# Patient Record
Sex: MALE | Race: WHITE | Employment: UNEMPLOYED | URBAN - METROPOLITAN AREA
[De-identification: names, ages, dates, MRNs, and addresses within clinical notes are randomized per-mention and may not be internally consistent; named-entity substitution may affect disease eponyms.]

---

## 2023-08-18 ENCOUNTER — LAB (OUTPATIENT)
Dept: LAB | Facility: CLINIC | Age: 58
End: 2023-08-18
Payer: COMMERCIAL

## 2023-08-18 ENCOUNTER — OFFICE VISIT (OUTPATIENT)
Dept: CARDIOLOGY | Facility: CLINIC | Age: 58
End: 2023-08-18
Payer: COMMERCIAL

## 2023-08-18 VITALS
SYSTOLIC BLOOD PRESSURE: 106 MMHG | HEIGHT: 69 IN | BODY MASS INDEX: 22.97 KG/M2 | HEART RATE: 66 BPM | WEIGHT: 155.1 LBS | DIASTOLIC BLOOD PRESSURE: 72 MMHG | OXYGEN SATURATION: 100 %

## 2023-08-18 DIAGNOSIS — I25.10 CORONARY ARTERY DISEASE INVOLVING NATIVE CORONARY ARTERY OF NATIVE HEART WITHOUT ANGINA PECTORIS: Primary | ICD-10-CM

## 2023-08-18 DIAGNOSIS — E11.9 TYPE 2 DIABETES MELLITUS WITHOUT COMPLICATION, WITHOUT LONG-TERM CURRENT USE OF INSULIN (H): ICD-10-CM

## 2023-08-18 DIAGNOSIS — E78.2 MIXED HYPERLIPIDEMIA: ICD-10-CM

## 2023-08-18 DIAGNOSIS — I25.10 CORONARY ARTERY DISEASE INVOLVING NATIVE CORONARY ARTERY OF NATIVE HEART WITHOUT ANGINA PECTORIS: ICD-10-CM

## 2023-08-18 DIAGNOSIS — I35.0 NONRHEUMATIC AORTIC VALVE STENOSIS: ICD-10-CM

## 2023-08-18 DIAGNOSIS — I10 ESSENTIAL HYPERTENSION: ICD-10-CM

## 2023-08-18 LAB
ALBUMIN SERPL BCG-MCNC: 4.3 G/DL (ref 3.5–5.2)
ALP SERPL-CCNC: 49 U/L (ref 40–129)
ALT SERPL W P-5'-P-CCNC: 41 U/L (ref 0–70)
ANION GAP SERPL CALCULATED.3IONS-SCNC: 8 MMOL/L (ref 7–15)
AST SERPL W P-5'-P-CCNC: 25 U/L (ref 0–45)
BILIRUB SERPL-MCNC: 0.8 MG/DL
BUN SERPL-MCNC: 23.7 MG/DL (ref 8–23)
CALCIUM SERPL-MCNC: 9.4 MG/DL (ref 8.8–10.2)
CHLORIDE SERPL-SCNC: 104 MMOL/L (ref 98–107)
CHOLEST SERPL-MCNC: 129 MG/DL
CREAT SERPL-MCNC: 0.89 MG/DL (ref 0.67–1.17)
CRP SERPL HS-MCNC: 0.32 MG/L
DEPRECATED HCO3 PLAS-SCNC: 26 MMOL/L (ref 22–29)
ERYTHROCYTE [DISTWIDTH] IN BLOOD BY AUTOMATED COUNT: 13.4 % (ref 10–15)
GFR SERPL CREATININE-BSD FRML MDRD: >90 ML/MIN/1.73M2
GLUCOSE SERPL-MCNC: 129 MG/DL (ref 70–99)
HBA1C MFR BLD: 5.5 %
HCT VFR BLD AUTO: 37.6 % (ref 40–53)
HDLC SERPL-MCNC: 71 MG/DL
HGB BLD-MCNC: 12.8 G/DL (ref 13.3–17.7)
LDLC SERPL CALC-MCNC: 52 MG/DL
MCH RBC QN AUTO: 33.6 PG (ref 26.5–33)
MCHC RBC AUTO-ENTMCNC: 34 G/DL (ref 31.5–36.5)
MCV RBC AUTO: 99 FL (ref 78–100)
NONHDLC SERPL-MCNC: 58 MG/DL
PLATELET # BLD AUTO: 185 10E3/UL (ref 150–450)
POTASSIUM SERPL-SCNC: 4.7 MMOL/L (ref 3.4–5.3)
PROT SERPL-MCNC: 6.7 G/DL (ref 6.4–8.3)
RBC # BLD AUTO: 3.81 10E6/UL (ref 4.4–5.9)
SODIUM SERPL-SCNC: 138 MMOL/L (ref 136–145)
TRIGL SERPL-MCNC: 30 MG/DL
WBC # BLD AUTO: 6.1 10E3/UL (ref 4–11)

## 2023-08-18 PROCEDURE — 93000 ELECTROCARDIOGRAM COMPLETE: CPT | Performed by: INTERNAL MEDICINE

## 2023-08-18 PROCEDURE — 80061 LIPID PANEL: CPT | Performed by: INTERNAL MEDICINE

## 2023-08-18 PROCEDURE — 86141 C-REACTIVE PROTEIN HS: CPT | Performed by: INTERNAL MEDICINE

## 2023-08-18 PROCEDURE — 83036 HEMOGLOBIN GLYCOSYLATED A1C: CPT | Performed by: INTERNAL MEDICINE

## 2023-08-18 PROCEDURE — 85027 COMPLETE CBC AUTOMATED: CPT | Performed by: INTERNAL MEDICINE

## 2023-08-18 PROCEDURE — 36415 COLL VENOUS BLD VENIPUNCTURE: CPT | Performed by: INTERNAL MEDICINE

## 2023-08-18 PROCEDURE — 80053 COMPREHEN METABOLIC PANEL: CPT | Performed by: INTERNAL MEDICINE

## 2023-08-18 PROCEDURE — 99204 OFFICE O/P NEW MOD 45 MIN: CPT | Performed by: INTERNAL MEDICINE

## 2023-08-18 RX ORDER — CARVEDILOL 6.25 MG/1
6.25 TABLET ORAL 2 TIMES DAILY WITH MEALS
COMMUNITY
End: 2023-09-15

## 2023-08-18 RX ORDER — ATORVASTATIN CALCIUM 40 MG/1
40 TABLET, FILM COATED ORAL DAILY
COMMUNITY
End: 2023-09-15

## 2023-08-18 RX ORDER — LISINOPRIL 10 MG/1
10 TABLET ORAL DAILY
COMMUNITY
End: 2023-09-15

## 2023-08-18 RX ORDER — CLOPIDOGREL BISULFATE 75 MG/1
75 TABLET ORAL DAILY
COMMUNITY
End: 2023-09-15

## 2023-08-18 NOTE — PROGRESS NOTES
CARDIOLOGY CLINIC CONSULTATION      REASON FOR CONSULT:   CAD, establish care    PRIMARY CARE PHYSICIAN:  Physician No Ref-Primary        History of Present Illness   Ady Lawton is an extremely pleasant 58 year old male here as a new patient to establish care.  He has a history significant for CAD c/b NSTEMI in 3/2022 at [facility] stenting of the LAD/D2 bifurcation, with mild-moderate nonobstructive CAD elsewhere, aortic stenosis, hypertension, dyslipidemia, and diet-controlled DM 2.  He has no family history of premature CAD.  He is a never smoker.  He no longer drinks alcohol.    He presents today to establish care with a cardiologist.  He is currently doing well with no cardiac symptoms whatsoever since his stenting.  He does not do as much formal exercise currently as he did during cardiac rehab, but he still continues to be very physically active and has dramatically reformed his diet.  He has lost over 85 pounds since March 2022.    As part of today's visit, I reviewed his care everywhere angiogram report, his most recent echocardiogram report, and his most recent labs in care everywhere including his TSH, BMP, CBC, A1c, and lipid panel.      Assessment & Plan     CAD c/b NSTEMI in 3/2022 at [facility] crush stenting of the LAD/D2 bifurcation  Mild-moderate nonobstructive CAD elsewhere, currently CCS 0 angina  Mild aortic stenosis  Hypertension, well controlled   Dyslipidemia  Diet-controlled DM 2  Never smoker      It was a pleasure to meet with Ady in clinic today.  I am glad to hear that he is currently feeling so well, and congratulated him on the excellent lifestyle changes that he has made over the past 18 months.  I encouraged him to keep this up.  For now, we will focus on secondary prevention.  His blood pressure is currently very well controlled.  We will check his lipid panel and adjust his statin if needed based on this.  We will also check a CRP and if  this is substantially elevated will plan to start him on low-dose colchicine.  He does mention some issues with gas so this may be a problem if he has excessive GI side effects, but we will deal with this if it arises.  We will also check a repeat echocardiogram to follow his mild aortic stenosis.      -Labs: CBC, CMP, A1c, CRP, lipid panel  -TTE  -Continue P2 Y12 inhibitor (Plavix) monotherapy lifelong  -Continue atorvastatin 40 mg daily for now; will adjust if needed based on lipid panel results  -Consider low-dose colchicine if CRP is significantly elevated  -Continue lisinopril 10 mg daily and carvedilol 6.25 mg twice daily  -Continue heart healthy diet and regular aerobic exercise        Follow-up: 1 year, or sooner as needed        Yovanny Mesa MD  Interventional Cardiology  August 18, 2023        Medications   Current Outpatient Medications   Medication    atorvastatin (LIPITOR) 40 MG tablet    carvedilol (COREG) 6.25 MG tablet    clopidogrel (PLAVIX) 75 MG tablet    lisinopril (ZESTRIL) 10 MG tablet     No current facility-administered medications for this visit.     Allergies   No Known Allergies      Physical Exam       BP: 106/72 Pulse: 66     SpO2: 100 %      Vital Signs with Ranges  Pulse:  [66] 66  BP: (106)/(72) 106/72  SpO2:  [100 %] 100 %  155 lbs 1.6 oz    Constitutional: Well-appearing, no acute distress  Respiratory: Normal respiratory effort, CTAB  Cardiovascular: RRR, 2/6 systolic murmur at the RUSB.  JVP < 7 cm H2O.  There is no LE edema.  Normal carotid upstrokes, no carotid bruits.

## 2023-08-18 NOTE — LETTER
8/18/2023    Physician No Ref-Primary  No address on file    RE: Ady Lawton       Dear Colleague,     I had the pleasure of seeing Ady Lawton in the [facility].  CARDIOLOGY CLINIC CONSULTATION      REASON FOR CONSULT:   CAD, establish care    PRIMARY CARE PHYSICIAN:  Physician No Ref-Primary        History of Present Illness  Ady Lawton is an extremely pleasant 58 year old male here as a new patient to establish care.  He has a history significant for CAD c/b NSTEMI in 3/2022 at [facility] s/ [facility] crush stenting of the LAD/D2 bifurcation, with mild-moderate nonobstructive CAD elsewhere, aortic stenosis, hypertension, dyslipidemia, and diet-controlled DM 2.  He has no family history of premature CAD.  He is a never smoker.  He no longer drinks alcohol.    He presents today to establish care with a cardiologist.  He is currently doing well with no cardiac symptoms whatsoever since his stenting.  He does not do as much formal exercise currently as he did during cardiac rehab, but he still continues to be very physically active and has dramatically reformed his diet.  He has lost over 85 pounds since March 2022.    As part of today's visit, I reviewed his care everywhere angiogram report, his most recent echocardiogram report, and his most recent labs in care everywhere including his TSH, BMP, CBC, A1c, and lipid panel.      Assessment & Plan    CAD c/b NSTEMI in 3/2022 at [facility] s/ [facility] crush stenting of the LAD/D2 bifurcation  Mild-moderate nonobstructive CAD elsewhere, currently CCS 0 angina  Mild aortic stenosis  Hypertension, well controlled   Dyslipidemia  Diet-controlled DM 2  Never smoker      It was a pleasure to meet with Ady in clinic today.  I am glad to hear that he is currently feeling so well, and congratulated him on the excellent lifestyle changes that he has made over the past 18 months.  I encouraged him to keep this up.  For now, we will focus on  secondary prevention.  His blood pressure is currently very well controlled.  We will check his lipid panel and adjust his statin if needed based on this.  We will also check a CRP and if this is substantially elevated will plan to start him on low-dose colchicine.  He does mention some issues with gas so this may be a problem if he has excessive GI side effects, but we will deal with this if it arises.  We will also check a repeat echocardiogram to follow his mild aortic stenosis.      -Labs: CBC, CMP, A1c, CRP, lipid panel  -TTE  -Continue P2 Y12 inhibitor (Plavix) monotherapy lifelong  -Continue atorvastatin 40 mg daily for now; will adjust if needed based on lipid panel results  -Consider low-dose colchicine if CRP is significantly elevated  -Continue lisinopril 10 mg daily and carvedilol 6.25 mg twice daily  -Continue heart healthy diet and regular aerobic exercise        Follow-up: 1 year, or sooner as needed        Yovanny Mesa MD  Interventional Cardiology  August 18, 2023        Medications  Current Outpatient Medications   Medication    atorvastatin (LIPITOR) 40 MG tablet    carvedilol (COREG) 6.25 MG tablet    clopidogrel (PLAVIX) 75 MG tablet    lisinopril (ZESTRIL) 10 MG tablet     No current facility-administered medications for this visit.     Allergies  No Known Allergies      Physical Exam      BP: 106/72 Pulse: 66     SpO2: 100 %      Vital Signs with Ranges  Pulse:  [66] 66  BP: (106)/(72) 106/72  SpO2:  [100 %] 100 %  155 lbs 1.6 oz    Constitutional: Well-appearing, no acute distress  Respiratory: Normal respiratory effort, CTAB  Cardiovascular: RRR, 2/6 systolic murmur at the RUSB.  JVP < 7 cm H2O.  There is no LE edema.  Normal carotid upstrokes, no carotid bruits.      Thank you for allowing me to participate in the care of your patient.      Sincerely,     Yovanny Mesa MD     [facility]  cc:   Referred Self,

## 2023-09-11 ENCOUNTER — HOSPITAL ENCOUNTER (OUTPATIENT)
Dept: CARDIOLOGY | Facility: CLINIC | Age: 58
Discharge: HOME | End: 2023-09-11
Attending: INTERNAL MEDICINE | Admitting: INTERNAL MEDICINE
Payer: COMMERCIAL

## 2023-09-11 DIAGNOSIS — I25.10 CORONARY ARTERY DISEASE INVOLVING NATIVE CORONARY ARTERY OF NATIVE HEART WITHOUT ANGINA PECTORIS: ICD-10-CM

## 2023-09-11 DIAGNOSIS — E11.9 TYPE 2 DIABETES MELLITUS WITHOUT COMPLICATION, WITHOUT LONG-TERM CURRENT USE OF INSULIN (H): ICD-10-CM

## 2023-09-11 DIAGNOSIS — E78.2 MIXED HYPERLIPIDEMIA: ICD-10-CM

## 2023-09-11 DIAGNOSIS — I10 ESSENTIAL HYPERTENSION: ICD-10-CM

## 2023-09-11 LAB — LVEF ECHO: NORMAL

## 2023-09-11 PROCEDURE — 93306 TTE W/DOPPLER COMPLETE: CPT | Mod: 26 | Performed by: INTERNAL MEDICINE

## 2023-09-11 PROCEDURE — 999N000208 ECHOCARDIOGRAM COMPLETE

## 2023-09-11 PROCEDURE — 255N000002 HC RX 255 OP 636: Performed by: INTERNAL MEDICINE

## 2023-09-11 RX ADMIN — HUMAN ALBUMIN MICROSPHERES AND PERFLUTREN 9 ML: 10; .22 INJECTION, SOLUTION INTRAVENOUS at 09:40

## 2023-09-15 ENCOUNTER — TELEPHONE (OUTPATIENT)
Dept: CARDIOLOGY | Facility: CLINIC | Age: 58
End: 2023-09-15
Payer: COMMERCIAL

## 2023-09-15 DIAGNOSIS — I35.0 NONRHEUMATIC AORTIC VALVE STENOSIS: ICD-10-CM

## 2023-09-15 DIAGNOSIS — E78.2 MIXED HYPERLIPIDEMIA: ICD-10-CM

## 2023-09-15 DIAGNOSIS — I10 ESSENTIAL HYPERTENSION: ICD-10-CM

## 2023-09-15 DIAGNOSIS — I25.10 CORONARY ARTERY DISEASE INVOLVING NATIVE CORONARY ARTERY OF NATIVE HEART WITHOUT ANGINA PECTORIS: ICD-10-CM

## 2023-09-15 RX ORDER — LISINOPRIL 10 MG/1
10 TABLET ORAL DAILY
Qty: 90 TABLET | Refills: 3 | Status: SHIPPED | OUTPATIENT
Start: 2023-09-15 | End: 2024-04-24

## 2023-09-15 RX ORDER — ATORVASTATIN CALCIUM 40 MG/1
40 TABLET, FILM COATED ORAL DAILY
Qty: 90 TABLET | Refills: 3 | Status: SHIPPED | OUTPATIENT
Start: 2023-09-15 | End: 2024-04-24

## 2023-09-15 RX ORDER — CLOPIDOGREL BISULFATE 75 MG/1
75 TABLET ORAL DAILY
Qty: 90 TABLET | Refills: 3 | Status: SHIPPED | OUTPATIENT
Start: 2023-09-15 | End: 2024-04-24

## 2023-09-15 RX ORDER — CARVEDILOL 6.25 MG/1
6.25 TABLET ORAL 2 TIMES DAILY WITH MEALS
Qty: 180 TABLET | Refills: 3 | Status: SHIPPED | OUTPATIENT
Start: 2023-09-15 | End: 2024-04-24

## 2023-09-15 NOTE — TELEPHONE ENCOUNTER
Ady left a v/m on Team 1 RN inquiring about his echo results and provided updated home and work numbers.  Chart updated accordingly.  3-month follow-up echo ordered.  Called and spoke with Ady to communicate Dr Mesa's recommendations below.  Patient will call scheduling himself to set up the echo.  Patient requested annual refills on his cardiac medications.  90 day supply + 0 refills sent to [facility] in [place] for lisinopril, clopidigrel, atorvastatin, and carvedilol.  Pt las saw Dr Mesa on 8/18/23.  [facility] Refill Guideline reviewed.  Medication meets criteria for refill.        Susan Tucker RN  9/12/2023 10:37 AM CDT       Left voicemail for pt's mother (Yany) due to pt's number being out of service. Wondering if there is an updated contact number for pt.  Left team 1's call back number for someone to call back and confirm.    Yovanny Meas MD  9/11/2023  4:48 PM CDT       Echo unfortunately shows worsening of his aortic stenosis, currently up to moderate-severe from only mild in April 2022.  Given his lack of exertional symptoms, I do not think that any intervention is indicated at this time, but I would suggest a short-term follow-up echo in 3 months (December 2023).     Thanks,  Rubio

## 2023-11-04 ENCOUNTER — HEALTH MAINTENANCE LETTER (OUTPATIENT)
Age: 58
End: 2023-11-04

## 2024-01-13 ENCOUNTER — HEALTH MAINTENANCE LETTER (OUTPATIENT)
Age: 59
End: 2024-01-13

## 2024-04-24 ENCOUNTER — OFFICE VISIT (OUTPATIENT)
Dept: CARDIOLOGY | Facility: CLINIC | Age: 59
End: 2024-04-24
Payer: COMMERCIAL

## 2024-04-24 VITALS
HEIGHT: 69 IN | BODY MASS INDEX: 24.42 KG/M2 | OXYGEN SATURATION: 100 % | HEART RATE: 64 BPM | SYSTOLIC BLOOD PRESSURE: 122 MMHG | WEIGHT: 164.9 LBS | DIASTOLIC BLOOD PRESSURE: 74 MMHG

## 2024-04-24 DIAGNOSIS — I35.0 NONRHEUMATIC AORTIC VALVE STENOSIS: Primary | ICD-10-CM

## 2024-04-24 DIAGNOSIS — I25.10 CORONARY ARTERY DISEASE INVOLVING NATIVE CORONARY ARTERY OF NATIVE HEART WITHOUT ANGINA PECTORIS: ICD-10-CM

## 2024-04-24 DIAGNOSIS — I10 ESSENTIAL HYPERTENSION: ICD-10-CM

## 2024-04-24 DIAGNOSIS — E78.2 MIXED HYPERLIPIDEMIA: ICD-10-CM

## 2024-04-24 DIAGNOSIS — J30.81 ALLERGIC RHINITIS DUE TO ANIMALS: ICD-10-CM

## 2024-04-24 PROCEDURE — 99214 OFFICE O/P EST MOD 30 MIN: CPT | Performed by: INTERNAL MEDICINE

## 2024-04-24 RX ORDER — CARVEDILOL 6.25 MG/1
6.25 TABLET ORAL 2 TIMES DAILY WITH MEALS
Qty: 180 TABLET | Refills: 3 | Status: SHIPPED | OUTPATIENT
Start: 2024-04-24

## 2024-04-24 RX ORDER — ROSUVASTATIN CALCIUM 20 MG/1
20 TABLET, COATED ORAL DAILY
Qty: 90 TABLET | Refills: 3 | Status: SHIPPED | OUTPATIENT
Start: 2024-04-24

## 2024-04-24 RX ORDER — CLOPIDOGREL BISULFATE 75 MG/1
75 TABLET ORAL DAILY
Qty: 90 TABLET | Refills: 3 | Status: SHIPPED | OUTPATIENT
Start: 2024-04-24

## 2024-04-24 RX ORDER — ATORVASTATIN CALCIUM 40 MG/1
40 TABLET, FILM COATED ORAL DAILY
Qty: 90 TABLET | Refills: 3 | Status: CANCELLED | OUTPATIENT
Start: 2024-04-24

## 2024-04-24 RX ORDER — LISINOPRIL 10 MG/1
10 TABLET ORAL DAILY
Qty: 90 TABLET | Refills: 3 | Status: SHIPPED | OUTPATIENT
Start: 2024-04-24

## 2024-04-24 NOTE — LETTER
4/24/2024    Physician No Ref-Primary  No address on file    RE: Ady Lawton       Dear Colleague,     I had the pleasure of seeing Ady Lawton in the [facility].  CARDIOLOGY CLINIC FOLLOW-UP NOTE      REASON FOR VISIT:   CAD, aortic stenosis    PRIMARY CARE PHYSICIAN:  Physician No Ref-Primary        History of Present Illness  Ady Lawton is an extremely pleasant 58 year old male here for routine follow-up.  He has a history significant for CAD c/b NSTEMI in 3/2022 at [facility] s/ [facility] crush stenting of the LAD/D2 bifurcation, with mild-moderate nonobstructive CAD elsewhere, aortic stenosis, hypertension, dyslipidemia, diet-controlled DM 2, and prior obesity (resolved with loss of over 80 lbs in 2022).  He has no family history of premature CAD.  He is a never smoker.  He no longer drinks alcohol.    Since his last visit with me in 8/2023, he reports that he is feeling well from a cardiac standpoint.  No chest pain, shortness of breath, palpitations, lower extremity swelling, or bleeding issues.  He does wonder if his cardiac medications may be causing him increased gas.  He has noticed this ever since being started on these medications following his NSTEMI.    His most recent labs are from 8/18/2023, showing total cholesterol 129, HDL 71, LDL 52, triglycerides 30, normal sodium and potassium, creatinine 0.89, hemoglobin 12.8, A1c 5.5%, and platelets 185 with a normal ALT.  I did order an echo following our last visit, but he had declined this due to cost.  His prior echo from 4/2022 showed mild aortic stenosis.      Assessment & Plan    CAD c/b NSTEMI in 3/2022 at [facility] s/ [facility] crush stenting of the LAD/D2 bifurcation, currently CCS 0 angina  Mild-moderate nonobstructive CAD elsewhere, currently CCS 0 angina  Mild aortic stenosis from 4/2022 outside [facility]  Hypertension, well controlled   Dyslipidemia  Diet-controlled DM 2  Prior obesity (resolved with loss of over  80 pounds in 2022)  Never smoker      It was a pleasure to meet with Ady again in clinic today.  I am glad to hear that he continues to do well from a cardiac standpoint.  In regards to the increased gas, I explained that none of his cardiac medications at present are likely to cause significant flatulence.  This is more common with certain other medications such as Zetia, etc.  My primary suspicion would be that the changes to his diet which allowed significant weight loss (and certainly are excellent for his overall health), such as increased fiber intake, may be responsible for his increased gas.  We talked about potentially seeing a nutritionist, etc. to discuss this in detail, but he would like to hold off for now.  Instead, he would like to try changing his statin, which I think is reasonable even if perhaps unlikely to benefit.  We will switch him from atorvastatin 40 mg daily to rosuvastatin 20 mg daily.  Otherwise, we will continue his other cardiac medications unchanged, and plan to see him back in 1 year, with a repeat echo beforehand.        -Continue P2 Y12 inhibitor (Plavix) monotherapy lifelong  -Change atorvastatin 40 mg daily to rosuvastatin 20 mg daily as above to see if this helps with gas  -Continue lisinopril 10 mg daily and carvedilol 6.25 mg twice daily  -Continue heart healthy diet and regular aerobic exercise        Follow-up: 1 year, with labs and TTE before hand, or sooner as needed        Yovanny Mesa MD  Interventional Cardiology  April 24, 2024        Medications  Current Outpatient Medications   Medication Sig Dispense Refill    atorvastatin (LIPITOR) 40 MG tablet Take 1 tablet (40 mg) by mouth daily 90 tablet 3    carvedilol (COREG) 6.25 MG tablet Take 1 tablet (6.25 mg) by mouth 2 times daily (with meals) 180 tablet 3    clopidogrel (PLAVIX) 75 MG tablet Take 1 tablet (75 mg) by mouth daily 90 tablet 3    lisinopril (ZESTRIL) 10 MG tablet Take 1 tablet (10 mg) by mouth daily  90 tablet 3     No current facility-administered medications for this visit.     Allergies  No Known Allergies      Physical Exam      BP: 122/74 Pulse: 64     SpO2: 100 %      Vital Signs with Ranges  Pulse:  [64] 64  BP: (122)/(74) 122/74  SpO2:  [100 %] 100 %  164 lbs 14.4 oz    Constitutional: Well-appearing, no acute distress  Respiratory: Normal respiratory effort, CTAB  Cardiovascular: RRR, 2/6 systolic murmur at the RUSB.  JVP < 7 cm H2O.  There is no LE edema.  Normal carotid upstrokes, no carotid bruits.      Thank you for allowing me to participate in the care of your patient.      Sincerely,     Yovanny Mesa MD     [facility]  cc:   No referring provider defined for this encounter.

## 2024-04-24 NOTE — PROGRESS NOTES
CARDIOLOGY CLINIC FOLLOW-UP NOTE      REASON FOR VISIT:   CAD, aortic stenosis    PRIMARY CARE PHYSICIAN:  Physician No Ref-Primary        History of Present Illness   Ady Lawton is an extremely pleasant 58 year old male here for routine follow-up.  He has a history significant for CAD c/b NSTEMI in 3/2022 at [facility]/ [facility] crush stenting of the LAD/D2 bifurcation, with mild-moderate nonobstructive CAD elsewhere, aortic stenosis, hypertension, dyslipidemia, diet-controlled DM 2, and prior obesity (resolved with loss of over 80 lbs in 2022).  He has no family history of premature CAD.  He is a never smoker.  He no longer drinks alcohol.    Since his last visit with me in 8/2023, he reports that he is feeling well from a cardiac standpoint.  No chest pain, shortness of breath, palpitations, lower extremity swelling, or bleeding issues.  He does wonder if his cardiac medications may be causing him increased gas.  He has noticed this ever since being started on these medications following his NSTEMI.    His most recent labs are from 8/18/2023, showing total cholesterol 129, HDL 71, LDL 52, triglycerides 30, normal sodium and potassium, creatinine 0.89, hemoglobin 12.8, A1c 5.5%, and platelets 185 with a normal ALT.  I did order an echo following our last visit, but he had declined this due to cost.  His prior echo from 4/2022 showed mild aortic stenosis.      Assessment & Plan     CAD c/b NSTEMI in 3/2022 at [facility] crush stenting of the LAD/D2 bifurcation, currently CCS 0 angina  Mild-moderate nonobstructive CAD elsewhere, currently CCS 0 angina  Mild aortic stenosis from 4/2022 outside [facility]  Hypertension, well controlled   Dyslipidemia  Diet-controlled DM 2  Prior obesity (resolved with loss of over 80 pounds in 2022)  Never smoker      It was a pleasure to meet with Ady again in clinic today.  I am glad to hear that he continues to do well from a cardiac standpoint.  In regards to  the increased gas, I explained that none of his cardiac medications at present are likely to cause significant flatulence.  This is more common with certain other medications such as Zetia, etc.  My primary suspicion would be that the changes to his diet which allowed significant weight loss (and certainly are excellent for his overall health), such as increased fiber intake, may be responsible for his increased gas.  We talked about potentially seeing a nutritionist, etc. to discuss this in detail, but he would like to hold off for now.  Instead, he would like to try changing his statin, which I think is reasonable even if perhaps unlikely to benefit.  We will switch him from atorvastatin 40 mg daily to rosuvastatin 20 mg daily.  Otherwise, we will continue his other cardiac medications unchanged, and plan to see him back in 1 year, with a repeat echo beforehand.        -Continue P2 Y12 inhibitor (Plavix) monotherapy lifelong  -Change atorvastatin 40 mg daily to rosuvastatin 20 mg daily as above to see if this helps with gas  -Continue lisinopril 10 mg daily and carvedilol 6.25 mg twice daily  -Continue heart healthy diet and regular aerobic exercise        Follow-up: 1 year, with labs and TTE before hand, or sooner as needed        Yovanny Mesa MD  Interventional Cardiology  April 24, 2024        Medications   Current Outpatient Medications   Medication Sig Dispense Refill    atorvastatin (LIPITOR) 40 MG tablet Take 1 tablet (40 mg) by mouth daily 90 tablet 3    carvedilol (COREG) 6.25 MG tablet Take 1 tablet (6.25 mg) by mouth 2 times daily (with meals) 180 tablet 3    clopidogrel (PLAVIX) 75 MG tablet Take 1 tablet (75 mg) by mouth daily 90 tablet 3    lisinopril (ZESTRIL) 10 MG tablet Take 1 tablet (10 mg) by mouth daily 90 tablet 3     No current facility-administered medications for this visit.     Allergies   No Known Allergies      Physical Exam       BP: 122/74 Pulse: 64     SpO2: 100 %      Vital  Signs with Ranges  Pulse:  [64] 64  BP: (122)/(74) 122/74  SpO2:  [100 %] 100 %  164 lbs 14.4 oz    Constitutional: Well-appearing, no acute distress  Respiratory: Normal respiratory effort, CTAB  Cardiovascular: RRR, 2/6 systolic murmur at the RUSB.  JVP < 7 cm H2O.  There is no LE edema.  Normal carotid upstrokes, no carotid bruits.

## 2024-06-01 ENCOUNTER — HEALTH MAINTENANCE LETTER (OUTPATIENT)
Age: 59
End: 2024-06-01

## 2024-06-03 ENCOUNTER — TRANSFERRED RECORDS (OUTPATIENT)
Dept: MULTI SPECIALTY CLINIC | Facility: CLINIC | Age: 59
End: 2024-06-03
Payer: COMMERCIAL

## 2024-06-03 LAB — RETINOPATHY: NORMAL

## 2024-06-11 ENCOUNTER — ORDERS ONLY (AUTO-RELEASED) (OUTPATIENT)
Dept: FAMILY MEDICINE | Facility: CLINIC | Age: 59
End: 2024-06-11

## 2024-06-11 ENCOUNTER — OFFICE VISIT (OUTPATIENT)
Dept: FAMILY MEDICINE | Facility: CLINIC | Age: 59
End: 2024-06-11
Payer: COMMERCIAL

## 2024-06-11 VITALS
HEIGHT: 70 IN | TEMPERATURE: 99.1 F | WEIGHT: 167.6 LBS | RESPIRATION RATE: 16 BRPM | OXYGEN SATURATION: 99 % | BODY MASS INDEX: 23.99 KG/M2 | SYSTOLIC BLOOD PRESSURE: 119 MMHG | DIASTOLIC BLOOD PRESSURE: 73 MMHG | HEART RATE: 66 BPM

## 2024-06-11 DIAGNOSIS — K43.9 VENTRAL HERNIA WITHOUT OBSTRUCTION OR GANGRENE: ICD-10-CM

## 2024-06-11 DIAGNOSIS — I25.810 CORONARY ARTERY DISEASE INVOLVING AUTOLOGOUS ARTERY CORONARY BYPASS GRAFT WITHOUT ANGINA PECTORIS: ICD-10-CM

## 2024-06-11 DIAGNOSIS — E11.9 TYPE 2 DIABETES MELLITUS WITHOUT COMPLICATION, WITHOUT LONG-TERM CURRENT USE OF INSULIN (H): ICD-10-CM

## 2024-06-11 DIAGNOSIS — Z12.11 SCREEN FOR COLON CANCER: ICD-10-CM

## 2024-06-11 DIAGNOSIS — I10 BENIGN ESSENTIAL HYPERTENSION: ICD-10-CM

## 2024-06-11 DIAGNOSIS — E78.5 HYPERLIPIDEMIA LDL GOAL <100: ICD-10-CM

## 2024-06-11 DIAGNOSIS — R14.2 BELCHING: ICD-10-CM

## 2024-06-11 DIAGNOSIS — Z76.89 ENCOUNTER TO ESTABLISH CARE: Primary | ICD-10-CM

## 2024-06-11 DIAGNOSIS — Z23 NEED FOR VACCINATION: ICD-10-CM

## 2024-06-11 DIAGNOSIS — Z11.59 NEED FOR HEPATITIS C SCREENING TEST: ICD-10-CM

## 2024-06-11 LAB
BASOPHILS # BLD AUTO: 0 10E3/UL (ref 0–0.2)
BASOPHILS NFR BLD AUTO: 1 %
EOSINOPHIL # BLD AUTO: 0.1 10E3/UL (ref 0–0.7)
EOSINOPHIL NFR BLD AUTO: 2 %
ERYTHROCYTE [DISTWIDTH] IN BLOOD BY AUTOMATED COUNT: 13.4 % (ref 10–15)
HBA1C MFR BLD: 5.7 % (ref 0–5.6)
HCT VFR BLD AUTO: 39.1 % (ref 40–53)
HGB BLD-MCNC: 12.8 G/DL (ref 13.3–17.7)
IMM GRANULOCYTES # BLD: 0 10E3/UL
IMM GRANULOCYTES NFR BLD: 0 %
LYMPHOCYTES # BLD AUTO: 1.3 10E3/UL (ref 0.8–5.3)
LYMPHOCYTES NFR BLD AUTO: 27 %
MCH RBC QN AUTO: 32.1 PG (ref 26.5–33)
MCHC RBC AUTO-ENTMCNC: 32.7 G/DL (ref 31.5–36.5)
MCV RBC AUTO: 98 FL (ref 78–100)
MONOCYTES # BLD AUTO: 0.6 10E3/UL (ref 0–1.3)
MONOCYTES NFR BLD AUTO: 12 %
NEUTROPHILS # BLD AUTO: 2.7 10E3/UL (ref 1.6–8.3)
NEUTROPHILS NFR BLD AUTO: 58 %
PLATELET # BLD AUTO: 188 10E3/UL (ref 150–450)
RBC # BLD AUTO: 3.99 10E6/UL (ref 4.4–5.9)
WBC # BLD AUTO: 4.7 10E3/UL (ref 4–11)

## 2024-06-11 PROCEDURE — 99204 OFFICE O/P NEW MOD 45 MIN: CPT | Mod: 25 | Performed by: PHYSICIAN ASSISTANT

## 2024-06-11 PROCEDURE — 99207 PR FOOT EXAM NO CHARGE: CPT | Performed by: PHYSICIAN ASSISTANT

## 2024-06-11 PROCEDURE — 83036 HEMOGLOBIN GLYCOSYLATED A1C: CPT | Performed by: PHYSICIAN ASSISTANT

## 2024-06-11 PROCEDURE — 85025 COMPLETE CBC W/AUTO DIFF WBC: CPT | Performed by: PHYSICIAN ASSISTANT

## 2024-06-11 PROCEDURE — 82570 ASSAY OF URINE CREATININE: CPT | Performed by: PHYSICIAN ASSISTANT

## 2024-06-11 PROCEDURE — 80053 COMPREHEN METABOLIC PANEL: CPT | Performed by: PHYSICIAN ASSISTANT

## 2024-06-11 PROCEDURE — 90471 IMMUNIZATION ADMIN: CPT | Performed by: PHYSICIAN ASSISTANT

## 2024-06-11 PROCEDURE — 90677 PCV20 VACCINE IM: CPT | Performed by: PHYSICIAN ASSISTANT

## 2024-06-11 PROCEDURE — 36415 COLL VENOUS BLD VENIPUNCTURE: CPT | Performed by: PHYSICIAN ASSISTANT

## 2024-06-11 PROCEDURE — 82043 UR ALBUMIN QUANTITATIVE: CPT | Performed by: PHYSICIAN ASSISTANT

## 2024-06-11 RX ORDER — FAMOTIDINE 20 MG/1
20 TABLET, FILM COATED ORAL 2 TIMES DAILY
Qty: 60 TABLET | Refills: 2 | Status: SHIPPED | OUTPATIENT
Start: 2024-06-11

## 2024-06-11 ASSESSMENT — PAIN SCALES - GENERAL: PAINLEVEL: NO PAIN (0)

## 2024-06-11 NOTE — NURSING NOTE
Prior to immunization administration, verified patients identity using patient s name and date of birth. Please see Immunization Activity for additional information.     Screening Questionnaire for Adult Immunization    Are you sick today?   No   Do you have allergies to medications, food, a vaccine component or latex?   Don't Know   Have you ever had a serious reaction after receiving a vaccination?   No   Do you have a long-term health problem with heart, lung, kidney, or metabolic disease (e.g., diabetes), asthma, a blood disorder, no spleen, complement component deficiency, a cochlear implant, or a spinal fluid leak?  Are you on long-term aspirin therapy?   Yes   Do you have cancer, leukemia, HIV/AIDS, or any other immune system problem?   No   Do you have a parent, brother, or sister with an immune system problem?   No   In the past 3 months, have you taken medications that affect  your immune system, such as prednisone, other steroids, or anticancer drugs; drugs for the treatment of rheumatoid arthritis, Crohn s disease, or psoriasis; or have you had radiation treatments?   No   Have you had a seizure, or a brain or other nervous system problem?   No   During the past year, have you received a transfusion of blood or blood    products, or been given immune (gamma) globulin or antiviral drug?   No   For women: Are you pregnant or is there a chance you could become       pregnant during the next month?   No   Have you received any vaccinations in the past 4 weeks?   No     Immunization questionnaire was positive for at least one answer.  Notified Akira Hart PA-C.      Patient instructed to remain in clinic for 15 minutes afterwards, and to report any adverse reactions.     Screening performed by Ember Ross CMA on 6/11/2024 at 11:05 AM.    \

## 2024-06-11 NOTE — Clinical Note
Please abstract the following data from this visit with this patient into the appropriate field in Epic:  Tests that can be patient reported without a hard copy:  Eye exam with ophthalmology on this date: 6/3/2024 Exam Location: [facility]   Other Tests found in the patient's chart through Chart Review/Care Everywhere:  {Abstract Quality List (Optional):490099}  Note to Abstraction: If this section is blank, no results were found via Chart Review/Care Everywhere.

## 2024-06-11 NOTE — PATIENT INSTRUCTIONS
-Start Pepcid 20 mg twice daily x2-3 months for belching.   Follow-up at that time to see if there is improvement.    -Option to see general surgery if enlarging hernia

## 2024-06-11 NOTE — PROGRESS NOTES
Assessment & Plan     Encounter to establish care  Plan for follow-up in 3 months.  See below for further workup.    Type 2 diabetes mellitus without complication, without long-term current use of insulin (H)  Check A1c as well as albumin/creatinine ratio today.  I commended him on his great diet efforts.  Okay to decrease frequency of at home glucose checks to 2-3 times a week.  Yearly eye exam.  - Hemoglobin A1c  - Albumin Random Urine Quantitative with Creat Ratio  - FOOT EXAM  - CBC with platelets and differential  - Comprehensive metabolic panel (BMP + Alb, Alk Phos, ALT, AST, Total. Bili, TP)    Coronary artery disease involving autologous artery coronary bypass graft without angina pectoris  Continue same medication and close follow-up with cardiology  - CBC with platelets and differential  - Comprehensive metabolic panel (BMP + Alb, Alk Phos, ALT, AST, Total. Bili, TP)    Benign essential hypertension  Well-controlled, labs today.  - CBC with platelets and differential  - Comprehensive metabolic panel (BMP + Alb, Alk Phos, ALT, AST, Total. Bili, TP)    Screen for colon cancer  Discussed having colon cancer screening options.  Opts for Cologuard.  - COLOGUARD([employer])    Ventral hernia without obstruction or gangrene  Belching  Discussed ventral/umbilical hernia with patient.  Declines intervention today.  Option the future if change in size or becomes painful could consider general surgery referral.  Unspecified belching/gassiness.  GERD friendly diet/lifestyle.  Start Pepcid 20 mg twice daily.  Hold off on PPI for now as he continues on Plavix.  - famotidine (PEPCID) 20 MG tablet  Dispense: 60 tablet; Refill: 2    Need for vaccination  Prevnar 20 given today.      45 minutes spent by me on the date of the encounter on chart review, review of test results, interpretation of tests, patient visit, and documentation         Return in about 3 months (around 9/11/2024) for follow-up per plan.    The  likelihood of other entities in the differential is insufficient to justify any further testing for them at this time. This was explained to the patient. The patient was advised that persistent or worsening symptoms would require further evaluation. Patient advised to call the office and if unable to reach to go to the emergency room if they develop any new or worsening symptoms. Expressed understanding and agreement with above stated plan.     MILY Chao [facility] JACKLYN Lawton is a pleasant 58 year old male presenting for the following health issues:  Patient presents with:  Establish Care  Diabetes: Medication follow up   Referral: Possible hernia   Health Maintenance:   Eye exam with ophthalmology on this date: 6/3/2024 Exam Location: [facility]  - Abstracted     Here today to establish care. 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  Seen by Dr. Mesa in Cardiology.  Coronary artery disease with stenting in 2022, aortic stenosis, hypertension, dyslipidemia as well as diet-controlled type 2 diabetes.    -Hx of diet-controlled type 2 diabetes.  Initially A1c 7.5%.  Resolved after an approximate 80 pound intentional weight loss secondary to diet.  Continues to monitor glucose readings at home. 100s every morning.  Last A1c 5.6%.  Continues to eat a healthy diet.  Yearly eye exams.    -Recent emergency department visit on 5/2/2024.  Diagnosed with a ventral/umbilical hernia.  Nonpainful.  Has noted belching over the past ~6 months.  Increased gassiness.  No changes in medication.  Denies overt heartburn/GERD type symptoms.  Weight stable.  Regular bowel movements.  No blood in stool.  Denies constipation.    -Has never had a colonoscopy.  No family history of colon cancer.      Review of Systems   Constitutional, HEENT, cardiovascular, pulmonary, GI, , musculoskeletal, neuro, skin, endocrine and psych systems are negative, except as  "otherwise noted.      Objective    /73 (BP Location: Right arm, Patient Position: Sitting, Cuff Size: Adult Regular)   Pulse 66   Temp 99.1  F (37.3  C) (Temporal)   Resp 16   Ht 1.778 m (5' 10\")   Wt 76 kg (167 lb 9.6 oz)   SpO2 99%   BMI 24.05 kg/m    5' 10\"  167 lbs 9.6 oz    Physical Exam   GENERAL: healthy, alert and no distress  EYES: Eyes grossly normal to inspection, PERRL and conjunctivae and sclerae normal  NECK: no adenopathy, no asymmetry, masses, or scars and thyroid normal to palpation  RESP: lungs clear to auscultation - no rales, rhonchi or wheezes  CV: regular rate and rhythm, normal S1 S2, no S3 or S4, no murmur, click or rub, no peripheral edema and peripheral pulses strong  ABDOMEN: soft, nontender, no hepatosplenomegaly, bowel sounds normal.  Small approximately golf ball sized umbilical/ventral hernia.  No signs of incarceration or strangulation.  Nonpainful with palpation.  MS: no gross musculoskeletal defects noted, no edema  SKIN: no suspicious lesions or rashes  NEURO: Normal strength and tone, mentation intact and speech normal  PSYCH: mentation appears normal, affect normal/bright      Answers submitted by the patient for this visit:  Diabetes Visit (Submitted on 6/11/2024)  Chief Complaint: Chronic problems general questions HPI Form  Frequency of checking blood sugars:: one time daily  What time of day are you checking your blood sugars : before meals  Have you had any blood sugars above 200?: No  Have you had any blood sugars below 70?: No  Hypoglycemia symptoms:: none  Diabetic concerns:: other  Paraesthesia present:: none of these symptoms  Have you had a diabetic eye exam within the last year?: No  General Questionnaire (Submitted on 6/11/2024)  Chief Complaint: Chronic problems general questions HPI Form  What is the reason for your visit today? : referal  How many servings of fruits and vegetables do you eat daily?: 2-3  On average, how many sweetened beverages do you " drink each day (Examples: soda, juice, sweet tea, etc.  Do NOT count diet or artificially sweetened beverages)?: 1  How many minutes a day do you exercise enough to make your heart beat faster?: 10 to 19  How many days per week do you miss taking your medication?: 0

## 2024-06-12 LAB
ALBUMIN SERPL BCG-MCNC: 4.4 G/DL (ref 3.5–5.2)
ALP SERPL-CCNC: 55 U/L (ref 40–150)
ALT SERPL W P-5'-P-CCNC: 45 U/L (ref 0–70)
ANION GAP SERPL CALCULATED.3IONS-SCNC: 10 MMOL/L (ref 7–15)
AST SERPL W P-5'-P-CCNC: 26 U/L (ref 0–45)
BILIRUB SERPL-MCNC: 0.9 MG/DL
BUN SERPL-MCNC: 21.4 MG/DL (ref 8–23)
CALCIUM SERPL-MCNC: 9.5 MG/DL (ref 8.8–10.2)
CHLORIDE SERPL-SCNC: 104 MMOL/L (ref 98–107)
CREAT SERPL-MCNC: 0.93 MG/DL (ref 0.67–1.17)
CREAT UR-MCNC: 203 MG/DL
DEPRECATED HCO3 PLAS-SCNC: 23 MMOL/L (ref 22–29)
EGFRCR SERPLBLD CKD-EPI 2021: >90 ML/MIN/1.73M2
GLUCOSE SERPL-MCNC: 94 MG/DL (ref 70–99)
MICROALBUMIN UR-MCNC: <12 MG/L
MICROALBUMIN/CREAT UR: NORMAL MG/G{CREAT}
POTASSIUM SERPL-SCNC: 4.3 MMOL/L (ref 3.4–5.3)
PROT SERPL-MCNC: 6.6 G/DL (ref 6.4–8.3)
SODIUM SERPL-SCNC: 137 MMOL/L (ref 135–145)

## 2024-06-13 NOTE — RESULT ENCOUNTER NOTE
Emery Ramsey,     -Stable blood counts when compared to 9 months ago.     -Your comprehensive metabolic panel which includes tests of liver function (ALT, AST, total bilirubin, alkaline phosphatase), kidney function (creatinine, BUN), electrolytes (sodium, potassium, calcium), and blood sugar (glucose) returned stable for you.    -A1c increased to 5.7% from 5.5%. Keep up great diet routine. Stay active!     Let me know if you have any questions or concerns,     Akira Bradshaw PA-C  [facility]

## 2024-07-26 LAB — NONINV COLON CA DNA+OCC BLD SCRN STL QL: NEGATIVE

## 2024-07-31 ENCOUNTER — PATIENT OUTREACH (OUTPATIENT)
Dept: CARE COORDINATION | Facility: CLINIC | Age: 59
End: 2024-07-31
Payer: COMMERCIAL

## 2024-10-19 ENCOUNTER — HEALTH MAINTENANCE LETTER (OUTPATIENT)
Age: 59
End: 2024-10-19

## 2024-12-22 ENCOUNTER — HEALTH MAINTENANCE LETTER (OUTPATIENT)
Age: 59
End: 2024-12-22

## 2025-01-26 ENCOUNTER — HEALTH MAINTENANCE LETTER (OUTPATIENT)
Age: 60
End: 2025-01-26

## 2025-03-04 ENCOUNTER — TELEPHONE (OUTPATIENT)
Dept: CARDIOLOGY | Facility: CLINIC | Age: 60
End: 2025-03-04
Payer: COMMERCIAL

## 2025-03-04 NOTE — TELEPHONE ENCOUNTER
1st attempt- Left voicemail for the patient to call back and schedule the following:    Appointment type:  Return Cardiology  Provider:  Moshe  Return date:  around 5/30  Additional appointment(s) needed:  NA  Additonal Notes:  R/S from [facility]  Specialty phone number: [phone]    Ruth Ann  on 3/4/2025 at 2:48 PM

## 2025-04-06 DIAGNOSIS — R14.2 BELCHING: ICD-10-CM

## 2025-04-07 RX ORDER — FAMOTIDINE 20 MG/1
20 TABLET, FILM COATED ORAL 2 TIMES DAILY
Qty: 60 TABLET | Refills: 2 | Status: SHIPPED | OUTPATIENT
Start: 2025-04-07

## 2025-05-03 ENCOUNTER — HEALTH MAINTENANCE LETTER (OUTPATIENT)
Age: 60
End: 2025-05-03

## 2025-07-14 DIAGNOSIS — I35.0 NONRHEUMATIC AORTIC VALVE STENOSIS: ICD-10-CM

## 2025-07-14 DIAGNOSIS — I25.10 CORONARY ARTERY DISEASE INVOLVING NATIVE CORONARY ARTERY OF NATIVE HEART WITHOUT ANGINA PECTORIS: ICD-10-CM

## 2025-07-14 DIAGNOSIS — I10 ESSENTIAL HYPERTENSION: ICD-10-CM

## 2025-07-14 DIAGNOSIS — E78.2 MIXED HYPERLIPIDEMIA: ICD-10-CM

## 2025-07-14 RX ORDER — ROSUVASTATIN CALCIUM 20 MG/1
20 TABLET, COATED ORAL DAILY
Qty: 90 TABLET | Refills: 0 | Status: SHIPPED | OUTPATIENT
Start: 2025-07-14

## 2025-07-14 RX ORDER — LISINOPRIL 10 MG/1
10 TABLET ORAL DAILY
Qty: 90 TABLET | Refills: 0 | Status: SHIPPED | OUTPATIENT
Start: 2025-07-14

## 2025-07-14 RX ORDER — CLOPIDOGREL BISULFATE 75 MG/1
75 TABLET ORAL DAILY
Qty: 90 TABLET | Refills: 0 | Status: SHIPPED | OUTPATIENT
Start: 2025-07-14

## 2025-07-14 RX ORDER — CARVEDILOL 6.25 MG/1
6.25 TABLET ORAL 2 TIMES DAILY WITH MEALS
Qty: 180 TABLET | Refills: 0 | Status: SHIPPED | OUTPATIENT
Start: 2025-07-14

## 2025-07-14 NOTE — TELEPHONE ENCOUNTER
[facility] Cardiology Refill Guideline reviewed.  Pt is due for labs and follow up. Refill letter sent to pt. Prescriptions sent to pharmacy for 90 day supply and 0 refills.

## 2025-07-16 ENCOUNTER — RESULTS FOLLOW-UP (OUTPATIENT)
Dept: FAMILY MEDICINE | Facility: CLINIC | Age: 60
End: 2025-07-16

## 2025-07-16 ENCOUNTER — OFFICE VISIT (OUTPATIENT)
Dept: FAMILY MEDICINE | Facility: CLINIC | Age: 60
End: 2025-07-16
Payer: COMMERCIAL

## 2025-07-16 VITALS
HEIGHT: 70 IN | OXYGEN SATURATION: 100 % | HEART RATE: 62 BPM | RESPIRATION RATE: 18 BRPM | TEMPERATURE: 96.9 F | BODY MASS INDEX: 23.19 KG/M2 | SYSTOLIC BLOOD PRESSURE: 130 MMHG | DIASTOLIC BLOOD PRESSURE: 68 MMHG | WEIGHT: 162 LBS

## 2025-07-16 DIAGNOSIS — Z12.5 SCREENING FOR PROSTATE CANCER: ICD-10-CM

## 2025-07-16 DIAGNOSIS — E78.5 HYPERLIPIDEMIA LDL GOAL <100: ICD-10-CM

## 2025-07-16 DIAGNOSIS — I10 BENIGN ESSENTIAL HYPERTENSION: ICD-10-CM

## 2025-07-16 DIAGNOSIS — I25.810 CORONARY ARTERY DISEASE INVOLVING AUTOLOGOUS ARTERY CORONARY BYPASS GRAFT WITHOUT ANGINA PECTORIS: ICD-10-CM

## 2025-07-16 DIAGNOSIS — E11.9 TYPE 2 DIABETES MELLITUS WITHOUT COMPLICATION, WITHOUT LONG-TERM CURRENT USE OF INSULIN (H): Primary | ICD-10-CM

## 2025-07-16 LAB
ALBUMIN SERPL BCG-MCNC: 4.3 G/DL (ref 3.5–5.2)
ALP SERPL-CCNC: 47 U/L (ref 40–150)
ALT SERPL W P-5'-P-CCNC: 27 U/L (ref 0–70)
ANION GAP SERPL CALCULATED.3IONS-SCNC: 11 MMOL/L (ref 7–15)
AST SERPL W P-5'-P-CCNC: 27 U/L (ref 0–45)
BILIRUB SERPL-MCNC: 0.7 MG/DL
BUN SERPL-MCNC: 22 MG/DL (ref 8–23)
CALCIUM SERPL-MCNC: 9.7 MG/DL (ref 8.8–10.4)
CHLORIDE SERPL-SCNC: 104 MMOL/L (ref 98–107)
CHOLEST SERPL-MCNC: 140 MG/DL
CREAT SERPL-MCNC: 0.97 MG/DL (ref 0.67–1.17)
CREAT UR-MCNC: 269 MG/DL
EGFRCR SERPLBLD CKD-EPI 2021: 89 ML/MIN/1.73M2
ERYTHROCYTE [DISTWIDTH] IN BLOOD BY AUTOMATED COUNT: 13.1 % (ref 10–15)
EST. AVERAGE GLUCOSE BLD GHB EST-MCNC: 111 MG/DL
FASTING STATUS PATIENT QL REPORTED: YES
FASTING STATUS PATIENT QL REPORTED: YES
GLUCOSE SERPL-MCNC: 122 MG/DL (ref 70–99)
HBA1C MFR BLD: 5.5 % (ref 0–5.6)
HCO3 SERPL-SCNC: 25 MMOL/L (ref 22–29)
HCT VFR BLD AUTO: 39 % (ref 40–53)
HDLC SERPL-MCNC: 71 MG/DL
HGB BLD-MCNC: 13.1 G/DL (ref 13.3–17.7)
LDLC SERPL CALC-MCNC: 62 MG/DL
MCH RBC QN AUTO: 32.7 PG (ref 26.5–33)
MCHC RBC AUTO-ENTMCNC: 33.6 G/DL (ref 31.5–36.5)
MCV RBC AUTO: 97 FL (ref 78–100)
MICROALBUMIN UR-MCNC: <12 MG/L
MICROALBUMIN/CREAT UR: NORMAL MG/G{CREAT}
NONHDLC SERPL-MCNC: 69 MG/DL
PLATELET # BLD AUTO: 162 10E3/UL (ref 150–450)
POTASSIUM SERPL-SCNC: 4.7 MMOL/L (ref 3.4–5.3)
PROT SERPL-MCNC: 6.7 G/DL (ref 6.4–8.3)
PSA SERPL DL<=0.01 NG/ML-MCNC: 1.39 NG/ML (ref 0–4.5)
RBC # BLD AUTO: 4.01 10E6/UL (ref 4.4–5.9)
SODIUM SERPL-SCNC: 140 MMOL/L (ref 135–145)
TRIGL SERPL-MCNC: 37 MG/DL
WBC # BLD AUTO: 5.4 10E3/UL (ref 4–11)

## 2025-07-16 PROCEDURE — 82043 UR ALBUMIN QUANTITATIVE: CPT | Performed by: FAMILY MEDICINE

## 2025-07-16 PROCEDURE — 82570 ASSAY OF URINE CREATININE: CPT | Performed by: FAMILY MEDICINE

## 2025-07-16 PROCEDURE — 80061 LIPID PANEL: CPT | Performed by: FAMILY MEDICINE

## 2025-07-16 PROCEDURE — 80053 COMPREHEN METABOLIC PANEL: CPT | Performed by: FAMILY MEDICINE

## 2025-07-16 PROCEDURE — 99214 OFFICE O/P EST MOD 30 MIN: CPT | Mod: 25 | Performed by: FAMILY MEDICINE

## 2025-07-16 PROCEDURE — 90750 HZV VACC RECOMBINANT IM: CPT | Performed by: FAMILY MEDICINE

## 2025-07-16 PROCEDURE — G0103 PSA SCREENING: HCPCS | Performed by: FAMILY MEDICINE

## 2025-07-16 PROCEDURE — 90471 IMMUNIZATION ADMIN: CPT | Performed by: FAMILY MEDICINE

## 2025-07-16 PROCEDURE — 1126F AMNT PAIN NOTED NONE PRSNT: CPT | Performed by: FAMILY MEDICINE

## 2025-07-16 PROCEDURE — 85027 COMPLETE CBC AUTOMATED: CPT | Performed by: FAMILY MEDICINE

## 2025-07-16 PROCEDURE — 83036 HEMOGLOBIN GLYCOSYLATED A1C: CPT | Performed by: FAMILY MEDICINE

## 2025-07-16 PROCEDURE — 36415 COLL VENOUS BLD VENIPUNCTURE: CPT | Performed by: FAMILY MEDICINE

## 2025-07-16 PROCEDURE — 3075F SYST BP GE 130 - 139MM HG: CPT | Performed by: FAMILY MEDICINE

## 2025-07-16 PROCEDURE — 3078F DIAST BP <80 MM HG: CPT | Performed by: FAMILY MEDICINE

## 2025-07-16 ASSESSMENT — PAIN SCALES - GENERAL: PAINLEVEL_OUTOF10: NO PAIN (0)

## 2025-07-16 NOTE — PROGRESS NOTES
Assessment & Plan     Type 2 diabetes mellitus without complication, without long-term current use of insulin (H)  Chronic medical conditions well-controlled currently diet controlled diabetes we will check his labs and follow-up on that.  No chest pains no shortness of breath.  Advised to follow-up with cardiology for ongoing care.  - HEMOGLOBIN A1C; Future  - Adult Eye  Referral; Future  - Albumin Random Urine Quantitative with Creat Ratio; Future  - Comprehensive metabolic panel; Future  - CBC with Platelets; Future  - HEMOGLOBIN A1C  - Albumin Random Urine Quantitative with Creat Ratio  - Comprehensive metabolic panel  - CBC with Platelets    Coronary artery disease involving autologous artery coronary bypass graft without angina pectoris  Advised to follow-up with cardiology for ongoing care  - Comprehensive metabolic panel; Future  - Lipid panel reflex to direct LDL Fasting; Future  - Comprehensive metabolic panel  - Lipid panel reflex to direct LDL Fasting    Benign essential hypertension    - Comprehensive metabolic panel; Future  - CBC with Platelets; Future  - Comprehensive metabolic panel  - CBC with Platelets    Hyperlipidemia LDL goal <100    - Lipid panel reflex to direct LDL Fasting; Future  - Lipid panel reflex to direct LDL Fasting    Screening for prostate cancer    - Prostate Specific Antigen Screen; Future  - Prostate Specific Antigen Screen        Annelise Ramsey is a 59 year old, presenting for the following health issues:  Diabetes (Labs /Refill rx /Patient needs test strips but is unsure on which strips he uses )      7/16/2025     8:33 AM   Additional Questions   Roomed by Aleksander HENSLEY   Patient clinic today to review his medications.  He has type 2 diabetes diet controlled underlying coronary artery disease stent placed 3 years ago.  Currently on appropriate medication including lisinopril cholesterol Plavix and Coreg.  He has been managed by cardiology.  They recently sent a  refill.  He denies any joint pain no shortness of breath  History of Present Illness       Diabetes:   He presents for follow up of diabetes.  He is checking home blood glucose one time daily.   He checks blood glucose before meals.  Blood glucose is never over 200 and never under 70.  When his blood glucose is low, the patient is asymptomatic for confusion, blurred vision, lethargy and reports not feeling dizzy, shaky, or weak.   He has no concerns regarding his diabetes at this time.   He is not experiencing numbness or burning in feet, excessive thirst, blurry vision, weight changes or redness, sores or blisters on feet. The patient has not had a diabetic eye exam in the last 12 months.          Vascular Disease:  He presents for follow up of vascular disease.     He never takes nitroglycerin. He is not taking daily aspirin.    He eats 2-3 servings of fruits and vegetables daily.He consumes 1 sweetened beverage(s) daily.He exercises with enough effort to increase his heart rate 10 to 19 minutes per day.  He exercises with enough effort to increase his heart rate 3 or less days per week.   He is taking medications regularly.        Diabetes Follow-up    How often are you checking your blood sugar? One time daily  What time of day are you checking your blood sugars (select all that apply)?  Morning   Have you had any blood sugars above 200?  No  Have you had any blood sugars below 70?  No  What symptoms do you notice when your blood sugar is low?  None  What concerns do you have today about your diabetes? None   Do you have any of these symptoms? (Select all that apply)  No numbness or tingling in feet.  No redness, sores or blisters on feet.  No complaints of excessive thirst.  No reports of blurry vision.  No significant changes to weight.  Have you had a diabetic eye exam in the last 12 months? No        BP Readings from Last 2 Encounters:   07/16/25 130/68   06/11/24 119/73     Hemoglobin A1C (%)   Date Value  "  06/11/2024 5.7 (H)   08/18/2023 5.5     LDL Cholesterol Calculated (mg/dL)   Date Value   08/18/2023 52         How many servings of fruits and vegetables do you eat daily?  2-3  On average, how many sweetened beverages do you drink each day (Examples: soda, juice, sweet tea, etc.  Do NOT count diet or artificially sweetened beverages)?   1  How many days per week do you exercise enough to make your heart beat faster? 3 or less  How many minutes a day do you exercise enough to make your heart beat faster? 10 - 19  How many days per week do you miss taking your medication? 0        Review of Systems  Constitutional, HEENT, cardiovascular, pulmonary, gi and gu systems are negative, except as otherwise noted.      Objective    /68   Pulse 62   Temp 96.9  F (36.1  C) (Tympanic)   Resp 18   Ht 1.778 m (5' 10\")   Wt 73.5 kg (162 lb)   SpO2 100%   BMI 23.24 kg/m    Body mass index is 23.24 kg/m .  Physical Exam   GENERAL: alert and no distress  NECK: no adenopathy, no asymmetry, masses, or scars  RESP: lungs clear to auscultation - no rales, rhonchi or wheezes  CV: regular rate and rhythm, normal S1 S2, no S3 or S4, no murmur, click or rub, no peripheral edema  ABDOMEN: soft, nontender, no hepatosplenomegaly, no masses and bowel sounds normal  MS: no gross musculoskeletal defects noted, no edema  Foot exam is normal.          Signed Electronically by: Deonte Boothe MD    "

## 2025-07-16 NOTE — LETTER
July 21, 2025      Braulio Lawton  [address] [place] [address]        Dear ,      I have reviewed your recent labs. Here are the results:     -Normal red blood cell (hgb) levels, normal white blood cell count and normal platelet levels. Borderline numbers are not significant , infact improved from previous labs.  -PSA (prostate specific antigen) test is normal.  This indicates a low likelihood of prostate cancer.  ADVISE: rechecking this in 1 year.  -Cholesterol levels (LDL,HDL, Triglycerides) are normal.  ADVISE: rechecking in 1 year.   -Liver and gallbladder tests are normal (ALT,AST, Alk phos, bilirubin), kidney function is normal (Cr, GFR), sodium is normal, potassium is normal, calcium is normal,   -A1C (test of diabetes control the last 2-3 months) is at your goal. Please continue with your current plan. Also, you should make an appointment to see me and recheck your A1C test in 6 months.        Resulted Orders   HEMOGLOBIN A1C   Result Value Ref Range    Estimated Average Glucose 111 <117 mg/dL    Hemoglobin A1C 5.5 0.0 - 5.6 %      Comment:      Normal <5.7%   Prediabetes 5.7-6.4%    Diabetes 6.5% or higher     Note: Adopted from ADA consensus guidelines.   Albumin Random Urine Quantitative with Creat Ratio   Result Value Ref Range    Creatinine Urine mg/dL 269.0 mg/dL      Comment:      The reference ranges have not been established in urine creatinine. The results should be integrated into the clinical context for interpretation.    Albumin Urine mg/L <12.0 mg/L      Comment:      The reference ranges have not been established in urine albumin. The results should be integrated into the clinical context for interpretation.    Albumin Urine mg/g Cr        Comment:      Unable to calculate, urine albumin and/or urine creatinine is outside detectable limits.  Microalbuminuria is defined as an albumin:creatinine ratio of 17 to 299 for males and 25 to 299 for females. A ratio of  albumin:creatinine of 300 or higher is indicative of overt proteinuria.  Due to biologic variability, positive results should be confirmed by a second, first-morning random or 24-hour timed urine specimen. If there is discrepancy, a third specimen is recommended. When 2 out of 3 results are in the microalbuminuria range, this is evidence for incipient nephropathy and warrants increased efforts at glucose control, blood pressure control, and institution of therapy with an angiotensin-converting-enzyme (ACE) inhibitor (if the patient can tolerate it).     Comprehensive metabolic panel   Result Value Ref Range    Sodium 140 135 - 145 mmol/L    Potassium 4.7 3.4 - 5.3 mmol/L    Carbon Dioxide (CO2) 25 22 - 29 mmol/L    Anion Gap 11 7 - 15 mmol/L    Urea Nitrogen 22.0 8.0 - 23.0 mg/dL    Creatinine 0.97 0.67 - 1.17 mg/dL    GFR Estimate 89 >60 mL/min/1.73m2      Comment:      eGFR calculated using 2021 CKD-EPI equation.    Calcium 9.7 8.8 - 10.4 mg/dL    Chloride 104 98 - 107 mmol/L    Glucose 122 (H) 70 - 99 mg/dL    Alkaline Phosphatase 47 40 - 150 U/L    AST 27 0 - 45 U/L    ALT 27 0 - 70 U/L    Protein Total 6.7 6.4 - 8.3 g/dL    Albumin 4.3 3.5 - 5.2 g/dL    Bilirubin Total 0.7 <=1.2 mg/dL    Patient Fasting > 8hrs? Yes    Lipid panel reflex to direct LDL Fasting   Result Value Ref Range    Cholesterol 140 <200 mg/dL    Triglycerides 37 <150 mg/dL    Direct Measure HDL 71 >=40 mg/dL    LDL Cholesterol Calculated 62 <100 mg/dL      Comment:      LDL calculated using the Friedewald equation.    Non HDL Cholesterol 69 <130 mg/dL    Patient Fasting > 8hrs? Yes     Narrative    Cholesterol  Desirable: < 200 mg/dL  Borderline High: 200 - 239 mg/dL  High: >= 240 mg/dL    Triglycerides  Normal: < 150 mg/dL  Borderline High: 150 - 199 mg/dL  High: 200-499 mg/dL  Very High: >= 500 mg/dL    Direct Measure HDL  Female: >= 50 mg/dL   Male: >= 40 mg/dL    LDL Cholesterol  Desirable: < 100 mg/dL  Above Desirable: 100 - 129 mg/dL    Borderline High: 130 - 159 mg/dL   High:  160 - 189 mg/dL   Very High: >= 190 mg/dL    Non HDL Cholesterol  Desirable: < 130 mg/dL  Above Desirable: 130 - 159 mg/dL  Borderline High: 160 - 189 mg/dL  High: 190 - 219 mg/dL  Very High: >= 220 mg/dL   Prostate Specific Antigen Screen   Result Value Ref Range    Prostate Specific Antigen Screen 1.39 0.00 - 4.50 ng/mL    Narrative    This result is obtained using the Roche Elecsys total PSA method on the wali e801 immunoassay analyzer, which is an ultrasensitive method. Results obtained with different assay methods or kits cannot be used interchangeably.  This test is intended for initial prostate cancer screening. PSA values exceeding the age-specific limits are suspicious for prostate disease, but additional testing, such as prostate biopsy, is needed to diagnose prostate pathology. The American Cancer Society recommends annual examination with digital rectal examination and serum PSA beginning at age 50 and for men with a life expectancy of at least 10 years after detection of prostate cancer. For men in high-risk groups, such as  Americans or men with a first-degree relative diagnosed at a younger age, testing should begin at a younger age. It is generally recommended that information be provided to patients about the benefits and limitations of testing and treatment so they can make informed decisions.   CBC with Platelets   Result Value Ref Range    WBC Count 5.4 4.0 - 11.0 10e3/uL    RBC Count 4.01 (L) 4.40 - 5.90 10e6/uL    Hemoglobin 13.1 (L) 13.3 - 17.7 g/dL    Hematocrit 39.0 (L) 40.0 - 53.0 %    MCV 97 78 - 100 fL    MCH 32.7 26.5 - 33.0 pg    MCHC 33.6 31.5 - 36.5 g/dL    RDW 13.1 10.0 - 15.0 %    Platelet Count 162 150 - 450 10e3/uL       If you have any questions or concerns, please call the clinic at the number listed above.     Sincerely,  Deonte Boothe MD    Electronically signed

## 2025-07-17 ENCOUNTER — PATIENT OUTREACH (OUTPATIENT)
Dept: CARE COORDINATION | Facility: CLINIC | Age: 60
End: 2025-07-17
Payer: COMMERCIAL

## 2025-07-21 ENCOUNTER — PATIENT OUTREACH (OUTPATIENT)
Dept: CARE COORDINATION | Facility: CLINIC | Age: 60
End: 2025-07-21
Payer: COMMERCIAL

## 2025-08-20 DIAGNOSIS — R14.2 BELCHING: ICD-10-CM

## 2025-08-20 RX ORDER — FAMOTIDINE 20 MG/1
20 TABLET, FILM COATED ORAL 2 TIMES DAILY
Qty: 60 TABLET | Refills: 2 | Status: SHIPPED | OUTPATIENT
Start: 2025-08-20